# Patient Record
Sex: MALE | URBAN - METROPOLITAN AREA
[De-identification: names, ages, dates, MRNs, and addresses within clinical notes are randomized per-mention and may not be internally consistent; named-entity substitution may affect disease eponyms.]

---

## 2020-05-21 ENCOUNTER — HISTORICAL (OUTPATIENT)
Dept: CARDIOLOGY | Facility: HOSPITAL | Age: 65
End: 2020-05-21

## 2020-05-21 LAB
APTT PPP: 25.8 SECOND(S) (ref 23.2–33.7)
INR PPP: 1 (ref 0–1.3)
PROTHROMBIN TIME: 13.1 SECOND(S) (ref 11.1–13.7)

## 2020-05-26 ENCOUNTER — HISTORICAL (OUTPATIENT)
Dept: ADMINISTRATIVE | Facility: HOSPITAL | Age: 65
End: 2020-05-26

## 2020-08-24 ENCOUNTER — HISTORICAL (OUTPATIENT)
Dept: RADIOLOGY | Facility: HOSPITAL | Age: 65
End: 2020-08-24

## 2020-08-24 LAB — POC CREATININE: 0.9 MG/DL (ref 0.6–1.3)

## 2022-04-07 ENCOUNTER — HISTORICAL (OUTPATIENT)
Dept: ADMINISTRATIVE | Facility: HOSPITAL | Age: 67
End: 2022-04-07

## 2022-04-23 VITALS
BODY MASS INDEX: 29 KG/M2 | DIASTOLIC BLOOD PRESSURE: 84 MMHG | SYSTOLIC BLOOD PRESSURE: 135 MMHG | WEIGHT: 226 LBS | HEIGHT: 74 IN

## 2022-04-28 NOTE — CONSULTS
Patient:   Markus Lopez            MRN: [id number]            FIN: [id number]               Age:   65 years     Sex:  Male     :  1955   Associated Diagnoses:   None   Author:   Guzman King PA-C      Basic Information   64 yo male CC  SOB recently worked up as outpatient/ temedicine per Covid19 guidelines Dr Tobar. PET positive.  now admitted for [facility] today which revealed sever 3 V CAD.  Dr Hammond consulted raya CABG eval.      PmHx - kidney stones  SHx- spinal sx  FHx- CAD  Social- neg      Review of Systems   Constitutional:  Decreased activity.    Eye:  Negative.    Ear/Nose/Mouth/Throat:  Negative.    Respiratory:  Shortness of breath.    Cardiovascular:  HPI.    Gastrointestinal:  Negative.    Genitourinary:  Negative.    Immunologic:  Negative.    Musculoskeletal:  Negative.    Neurologic:  Alert and oriented X4.    Psychiatric:  Negative.       Health Status   Allergies:    Allergic Reactions (Selected)  Severity Not Documented  Penicillins- Unknown.,    Allergies (1) Active Reaction  penicillins Unknown     Current medications:  (Selected)   Inpatient Medications  Ordered  Normal Saline (0.9% NS) IV 1,000 mL: 1,000 mL, 1,000 mL, IV, 100 mL/hr, start date 20 6:33:00 CDT  Documented Medications  Documented  Prilosec 20 mg ([facility] Substitution): 40 mg, Oral, Daily, # 30, 0 Refill(s)  aspirin 81 mg oral tablet: 1 tab, Oral, Daily, 0 Refill(s),    Medications (1) Active  Scheduled: (0)  Continuous: (1)  sodium chloride 0.9% 1,000 mL  1,000 mL, IV, 100 mL/hr  PRN: (0)     Problem list:    All Problems  Dyspnea on exertion / SNOMED CT 863806507 / Confirmed  Hypersomnolence caused by substance / SNOMED CT 4970898278 / Confirmed  Kidney stone / SNOMED CT 173064183 / Confirmed  Obesity / SNOMED CT 0157606835 / Probable      Physical Examination   General:  Alert and oriented, No acute distress.    Eye:  Pupils are equal, round and reactive to light, Extraocular movements are intact.     HENT:  Normocephalic.    Neck:  Supple.    Respiratory:  Lungs are clear to auscultation, Respirations are non-labored.    Cardiovascular:  Normal rate, Regular rhythm, No murmur.    Gastrointestinal:  Soft, Non-tender, Non-distended, Normal bowel sounds.       Vital Signs (last 24 hrs)_____  Last Charted___________  Temp Oral     36.4 DegC  (MAY 21 06:52)  Heart Rate Peripheral   63 bpm  (MAY 21 10:45)  Resp Rate         14 br/min  (MAY 21 10:45)  SBP      125 mmHg  (MAY 21 10:45)  DBP      82 mmHg  (MAY 21 10:45)  SpO2      97 %  (MAY 21 10:45)  Weight      107.4 kg  (MAY 21 06:44)  Height      188 cm  (MAY 21 06:44)  BMI      30.39  (MAY 21 06:44)     Musculoskeletal:  Normal range of motion, Normal strength.    Integumentary:  Warm, Dry.    Neurologic:  Alert, Oriented, Normal sensory, Normal motor function, No focal deficits, Cranial Nerves II-XII are grossly intact.    Psychiatric:  Cooperative, Appropriate mood & affect.       Review / Management   Results review:     Labs (Last four charted values)  PT                   13.1 (MAY 21)   INR                  1.0 (MAY 21)   PTT                  25.8 (MAY 21) .       Impression and Plan   CAD- plan CABG LIMA, L radial, EVH tomorrow.